# Patient Record
Sex: FEMALE | Race: WHITE
[De-identification: names, ages, dates, MRNs, and addresses within clinical notes are randomized per-mention and may not be internally consistent; named-entity substitution may affect disease eponyms.]

---

## 2020-07-09 ENCOUNTER — HOSPITAL ENCOUNTER (INPATIENT)
Dept: HOSPITAL 54 - ER | Age: 65
LOS: 1 days | DRG: 190 | End: 2020-07-10
Attending: INTERNAL MEDICINE | Admitting: INTERNAL MEDICINE
Payer: COMMERCIAL

## 2020-07-09 VITALS — WEIGHT: 189 LBS | BODY MASS INDEX: 33.49 KG/M2 | HEIGHT: 63 IN

## 2020-07-09 DIAGNOSIS — I21.4: Primary | ICD-10-CM

## 2020-07-09 DIAGNOSIS — E87.6: ICD-10-CM

## 2020-07-09 DIAGNOSIS — I46.9: ICD-10-CM

## 2020-07-09 DIAGNOSIS — I31.3: ICD-10-CM

## 2020-07-09 DIAGNOSIS — J96.01: ICD-10-CM

## 2020-07-09 DIAGNOSIS — R53.1: ICD-10-CM

## 2020-07-09 DIAGNOSIS — I10: ICD-10-CM

## 2020-07-09 DIAGNOSIS — R40.2252: ICD-10-CM

## 2020-07-09 DIAGNOSIS — R40.2142: ICD-10-CM

## 2020-07-09 DIAGNOSIS — R40.2362: ICD-10-CM

## 2020-07-09 LAB
BASOPHILS # BLD AUTO: 0.1 /CMM (ref 0–0.2)
BASOPHILS NFR BLD AUTO: 0.4 % (ref 0–2)
EOSINOPHIL NFR BLD AUTO: 1.3 % (ref 0–6)
HCT VFR BLD AUTO: 44 % (ref 33–45)
HGB BLD-MCNC: 14.1 G/DL (ref 11.5–14.8)
LYMPHOCYTES NFR BLD AUTO: 24.8 % (ref 20–44)
LYMPHOCYTES NFR BLD AUTO: 3.5 /CMM (ref 0.8–4.8)
MCHC RBC AUTO-ENTMCNC: 32 G/DL (ref 31–36)
MCV RBC AUTO: 89 FL (ref 82–100)
MONOCYTES NFR BLD AUTO: 1.3 /CMM (ref 0.1–1.3)
MONOCYTES NFR BLD AUTO: 9.3 % (ref 2–12)
NEUTROPHILS # BLD AUTO: 9.1 /CMM (ref 1.8–8.9)
NEUTROPHILS NFR BLD AUTO: 64.2 % (ref 43–81)
PLATELET # BLD AUTO: 186 /CMM (ref 150–450)
RBC # BLD AUTO: 5 MIL/UL (ref 4–5.2)
WBC NRBC COR # BLD AUTO: 14.2 K/UL (ref 4.3–11)

## 2020-07-09 PROCEDURE — A6403 STERILE GAUZE>16 <= 48 SQ IN: HCPCS

## 2020-07-09 PROCEDURE — G0378 HOSPITAL OBSERVATION PER HR: HCPCS

## 2020-07-09 PROCEDURE — C1751 CATH, INF, PER/CENT/MIDLINE: HCPCS

## 2020-07-10 VITALS — SYSTOLIC BLOOD PRESSURE: 86 MMHG | DIASTOLIC BLOOD PRESSURE: 56 MMHG

## 2020-07-10 VITALS — DIASTOLIC BLOOD PRESSURE: 35 MMHG | SYSTOLIC BLOOD PRESSURE: 84 MMHG

## 2020-07-10 VITALS — SYSTOLIC BLOOD PRESSURE: 108 MMHG | DIASTOLIC BLOOD PRESSURE: 50 MMHG

## 2020-07-10 VITALS — DIASTOLIC BLOOD PRESSURE: 40 MMHG | SYSTOLIC BLOOD PRESSURE: 66 MMHG

## 2020-07-10 VITALS — SYSTOLIC BLOOD PRESSURE: 88 MMHG | DIASTOLIC BLOOD PRESSURE: 52 MMHG

## 2020-07-10 VITALS — SYSTOLIC BLOOD PRESSURE: 59 MMHG | DIASTOLIC BLOOD PRESSURE: 33 MMHG

## 2020-07-10 VITALS — SYSTOLIC BLOOD PRESSURE: 168 MMHG | DIASTOLIC BLOOD PRESSURE: 100 MMHG

## 2020-07-10 VITALS — DIASTOLIC BLOOD PRESSURE: 42 MMHG | SYSTOLIC BLOOD PRESSURE: 78 MMHG

## 2020-07-10 VITALS — SYSTOLIC BLOOD PRESSURE: 154 MMHG | DIASTOLIC BLOOD PRESSURE: 81 MMHG

## 2020-07-10 VITALS — SYSTOLIC BLOOD PRESSURE: 122 MMHG | DIASTOLIC BLOOD PRESSURE: 83 MMHG

## 2020-07-10 VITALS — SYSTOLIC BLOOD PRESSURE: 114 MMHG | DIASTOLIC BLOOD PRESSURE: 75 MMHG

## 2020-07-10 VITALS — DIASTOLIC BLOOD PRESSURE: 45 MMHG | SYSTOLIC BLOOD PRESSURE: 122 MMHG

## 2020-07-10 VITALS — SYSTOLIC BLOOD PRESSURE: 93 MMHG | DIASTOLIC BLOOD PRESSURE: 65 MMHG

## 2020-07-10 VITALS — DIASTOLIC BLOOD PRESSURE: 15 MMHG | SYSTOLIC BLOOD PRESSURE: 31 MMHG

## 2020-07-10 VITALS — DIASTOLIC BLOOD PRESSURE: 25 MMHG | SYSTOLIC BLOOD PRESSURE: 67 MMHG

## 2020-07-10 VITALS — DIASTOLIC BLOOD PRESSURE: 81 MMHG | SYSTOLIC BLOOD PRESSURE: 154 MMHG

## 2020-07-10 VITALS — DIASTOLIC BLOOD PRESSURE: 101 MMHG | SYSTOLIC BLOOD PRESSURE: 194 MMHG

## 2020-07-10 VITALS — DIASTOLIC BLOOD PRESSURE: 48 MMHG | SYSTOLIC BLOOD PRESSURE: 63 MMHG

## 2020-07-10 LAB
ALBUMIN SERPL BCP-MCNC: 4.1 G/DL (ref 3.4–5)
ALP SERPL-CCNC: 63 U/L (ref 46–116)
ALT SERPL W P-5'-P-CCNC: 22 U/L (ref 12–78)
AST SERPL W P-5'-P-CCNC: 24 U/L (ref 15–37)
BILIRUB DIRECT SERPL-MCNC: 0.2 MG/DL (ref 0–0.2)
BILIRUB SERPL-MCNC: 0.6 MG/DL (ref 0.2–1)
BUN SERPL-MCNC: 25 MG/DL (ref 7–18)
CALCIUM SERPL-MCNC: 9.2 MG/DL (ref 8.5–10.1)
CHLORIDE SERPL-SCNC: 100 MMOL/L (ref 98–107)
CO2 SERPL-SCNC: 30 MMOL/L (ref 21–32)
CREAT SERPL-MCNC: 1 MG/DL (ref 0.6–1.3)
GLUCOSE SERPL-MCNC: 171 MG/DL (ref 74–106)
PH UR STRIP: 5.5 [PH] (ref 5–8)
POTASSIUM SERPL-SCNC: 2.8 MMOL/L (ref 3.5–5.1)
PROT SERPL-MCNC: 7 G/DL (ref 6.4–8.2)
PROT UR QL STRIP: 100 MG/DL
RBC #/AREA URNS HPF: (no result) /HPF (ref 0–2)
SODIUM SERPL-SCNC: 141 MMOL/L (ref 136–145)
UROBILINOGEN UR STRIP-MCNC: 0.2 EU/DL
WBC #/AREA URNS HPF: (no result) /HPF (ref 0–3)

## 2020-07-10 PROCEDURE — 0BH18EZ INSERTION OF ENDOTRACHEAL AIRWAY INTO TRACHEA, VIA NATURAL OR ARTIFICIAL OPENING ENDOSCOPIC: ICD-10-PCS

## 2020-07-10 PROCEDURE — 5A12012 PERFORMANCE OF CARDIAC OUTPUT, SINGLE, MANUAL: ICD-10-PCS

## 2020-07-10 PROCEDURE — 06HY33Z INSERTION OF INFUSION DEVICE INTO LOWER VEIN, PERCUTANEOUS APPROACH: ICD-10-PCS

## 2020-07-10 RX ADMIN — POTASSIUM CHLORIDE SCH MLS/HR: 200 INJECTION, SOLUTION INTRAVENOUS at 03:44

## 2020-07-10 RX ADMIN — POTASSIUM CHLORIDE SCH MLS/HR: 200 INJECTION, SOLUTION INTRAVENOUS at 01:18

## 2020-07-10 RX ADMIN — POTASSIUM CHLORIDE SCH MLS/HR: 200 INJECTION, SOLUTION INTRAVENOUS at 04:44

## 2020-07-10 RX ADMIN — POTASSIUM CHLORIDE SCH MLS/HR: 200 INJECTION, SOLUTION INTRAVENOUS at 02:41

## 2020-07-10 NOTE — NUR
ICU/RN 

ER DOCTOR DR KULKARNI PLACE RIGHT FEMORAL TLC.DR REY AND DR STEEL AT BED SIDE.PT STARTED 
ON DOPAMINE DRIP AND LEVOPHED DRIP.IV BOLUS IS INFUSING.ABG ORDERED.

## 2020-07-10 NOTE — NUR
rt note



rapid response was called to patients room. upon arrival patient was found down on the 
floor. patient appeared cyanotic placed back on the bed and assessed for pulse. faint pulse 
observed. left the room to retrieve suction kit. upon return code blue was called and nurse 
started cpr. pt placed on non rebreather then bagged pre intubation. dr. caldwell intubates 
patient then transferred to icu. 

-------------------------------------------------------------------------------

Addendum: 07/10/20 at 0915 by DEREK JOSHUA RT

-------------------------------------------------------------------------------

Amended: Links added.

## 2020-07-10 NOTE — NUR
RN NOTES



AT 0644 PATIENT FOUND ON THE FLOOR ( ROOM 323-2 ) RIGHT SIDE LYING, NOTED WITH BLEEDING ON 
THE FLOOR. ASSISTED AND PUT BACK TO BED, 0645 CALLED FOR RAPID RESPONSE. NOTED PULSELESS, 
UNRESPONSIVE, CPR INITIATED, 2 FULL MINUTES CPR DONE, 3WEST CHARGE NURSE AT BEDSIDE [place] FONSECA RN (CHARGE NURSE), CARMEN ROSA,RN, DAYANARA,RN AND JOHN FRANKLIN, KEVIN BEAR RN 
( RECORDER ) LISETTE MEDINA/ LISETTE LEW AT BEDSIDE. VITAL SIGNS TAKEN AND RECORDED HR 77 - TELE 
MONITOR READS 110, /75, RR 15, SATING 91% BLOOD SUGAR 151MG/DL. RAPID RESPONSE TEAM 
ARRIVED AT 0650 ANN MOHR RN (ICU NURSE ) SYBIL PIMENTEL RN (ER NURSE), RT CASANDRA BILLINGS,RT. 0652 TRIAL INTUBATION DONE. 0653  100 SUCCINATE / FLUSH GIVEN 20 ETOMIDATE / FLUSH 
ADMINISTERED.  BILATERAL SOFT WRIST RESTRAINTS APPLIED, PATIENT WAS INTUBATED BY DR. ANH KULKARNI MD AT 0656. 0659 VITAL SIGNS CHECKED  BP 90/51. 0700 TRANSFERRED TO ICU 
. ER DOCTOR ANH KULKARNI REFUSED TO SIGN CODE BLUE RECORD. 



REPORT GIVEN TO ICU NURSE SANDY FRANKLIN RN

## 2020-07-10 NOTE — NUR
ICU/RN

ESTHER NOTIFIED.CASE NUMBER IS [id number] LIZ. TALK TO THE FAMILY SISTER ELÍAS SOLOMON NOTIFIED.FAMILY HAS NO  MORTUARY ARRANGEMENT AT THIS TIME.BODY WILL GO TO [facility].

## 2020-07-10 NOTE — NUR
ICU/RN

PT IS TRANSFERRED FROM 3 RD FLOOR ,S/P RRT AND CODE BLUE.PT IS INTUBATED ON THE VENT .NEW IV 
INSERTED.PT PLACED ON MONITOR.SINUS RHYTHM ON MONITOR.

## 2020-07-10 NOTE — NUR
RN TELE NOTES



RECEIVED PATIENT TRANSPORTED FROM ER. FIRST BAG OF POTASSIUM CHLORIDE 10 MEQS IV ONGOING. IV 
ACCESS ON HER LEFT WRIST INTACT AND PATENT. ADMISSION INITIATED. INITIAL PHYSICAL ASSESSMENT 
DONE, SKIN INTACT. NO SKIN ISSUES AT THIS TIME. BELONGING LESS DONE. NOTED MONEY IN HER 
PURSE, ACCOUNTED AND PLACED IN SAFE, PATIENT SIGNED AND ACKNOWLEDGED THE BELONGINGS LIST. 
NOTED 5 BOTTLES OF HOME/ PRESCRIPTION MEDICATIONS. REPOSITIONED FOR COMFORT. ORIENTED TO 
UNIT AND THE USE OF CALL LIGHT. CONNECTED TO TELE MONITOR, READS SINUS PRATIK 60s. PATIENT 
COMPLAINED OF HEADACHE, TYLENOL 650 MG TABLET GIVEN PO AS PRN ORDER. ALL NEEDS ANTICIPATED, 
ALERT ORIENTED X3-4, SLIGHTLY WEAK, KEEP RESTED, ASPIRATION PRECAUTION EMPHASIZED. WILL 
CONTINUE TO MONITOR ACCORDINGLY.

## 2020-07-10 NOTE — NUR
RN NOTES



PATIENT'S DAUGHTER CARMEN CALLED ( [phone] ). INFORMED REGARDING PATIENTS' CURRENT 
CONDITION, PATIENT IS ALERT ORIENTED X4, ABOUT TO RE INSERT PERIPHERAL IV LINE, CALM RESTING 
COMFORTABLY, TELE MONITOR READS SINUS RHYTHM 60s -70s. CALL LIGHT WITHIN EASY REACH, BED IN 
LOW LOCKED POSITIONED. SAFETY MEASURES IN PLACE.